# Patient Record
Sex: FEMALE | Race: WHITE | URBAN - METROPOLITAN AREA
[De-identification: names, ages, dates, MRNs, and addresses within clinical notes are randomized per-mention and may not be internally consistent; named-entity substitution may affect disease eponyms.]

---

## 2017-08-17 ENCOUNTER — TELEPHONE (OUTPATIENT)
Dept: GASTROENTEROLOGY | Facility: CLINIC | Age: 67
End: 2017-08-17

## 2018-03-14 ENCOUNTER — TELEPHONE (OUTPATIENT)
Dept: GASTROENTEROLOGY | Facility: CLINIC | Age: 68
End: 2018-03-14

## 2018-03-14 NOTE — TELEPHONE ENCOUNTER
Pt is calling to know which type of procedure she would be having for a 3 yr repeat cln.  Pt already spoke with jose and they told her to call  and speak with clinical staff to find out which test she would be having so she knows her out of pocket ex

## 2018-03-15 NOTE — TELEPHONE ENCOUNTER
Pt would be getting a diagnostic CLN for a history of colon polyps. CPT code 22966 Dx [id number]. LMTCB.

## 2018-03-15 NOTE — TELEPHONE ENCOUNTER
Spoke with Elisabeth and info below relayed, codes provided, and phone number to [facility]/financial counselor provided. She will verify with her insurance, unsure if we are still in her insurance network.

## (undated) NOTE — LETTER
8/17/2017    [address] [facility] [id number]            Dear Lizabeth Pantoja,      Our records indicate that you are due for an appointment for a Colonoscopy on or about October 2017 with Antonietta Hopkins MD.    Please yohannes